# Patient Record
Sex: FEMALE | URBAN - METROPOLITAN AREA
[De-identification: names, ages, dates, MRNs, and addresses within clinical notes are randomized per-mention and may not be internally consistent; named-entity substitution may affect disease eponyms.]

---

## 2018-09-10 ENCOUNTER — TRANSFERRED RECORDS (OUTPATIENT)
Dept: HEALTH INFORMATION MANAGEMENT | Facility: CLINIC | Age: 53
End: 2018-09-10

## 2018-12-03 ENCOUNTER — PATIENT OUTREACH (OUTPATIENT)
Dept: CARE COORDINATION | Facility: CLINIC | Age: 53
End: 2018-12-03

## 2018-12-03 NOTE — TELEPHONE ENCOUNTER
FUTURE VISIT INFORMATION      FUTURE VISIT INFORMATION:    Date: 12/05/18    Time: 730am    Location: [facility]  REFERRAL INFORMATION:    Referring provider:  MICHELLE CURRAN    Referring providers clinic:  [facility]    Reason for visit/diagnosis  Mild Papilledema    RECORDS REQUESTED FROM:       Clinic name Comments Records Status Imaging Status   [facility] Notes transferred to HIM IN [facility] CT Sinus 4/2018  In PACS   [facility] CT Facial bones 11/2012  In PACS

## 2018-12-04 ENCOUNTER — PATIENT OUTREACH (OUTPATIENT)
Dept: CARE COORDINATION | Facility: CLINIC | Age: 53
End: 2018-12-04

## 2018-12-05 ENCOUNTER — PRE VISIT (OUTPATIENT)
Dept: OPHTHALMOLOGY | Facility: CLINIC | Age: 53
End: 2018-12-05

## 2019-01-07 ENCOUNTER — TELEPHONE (OUTPATIENT)
Dept: OPHTHALMOLOGY | Facility: CLINIC | Age: 54
End: 2019-01-07

## 2019-01-07 ENCOUNTER — OFFICE VISIT (OUTPATIENT)
Dept: OPHTHALMOLOGY | Facility: CLINIC | Age: 54
End: 2019-01-07
Attending: OPHTHALMOLOGY
Payer: COMMERCIAL

## 2019-01-07 DIAGNOSIS — H53.10 SUBJECTIVE VISUAL DISTURBANCE: Primary | ICD-10-CM

## 2019-01-07 DIAGNOSIS — H47.093 DISORDER OF OPTIC NERVE OF BOTH EYES: ICD-10-CM

## 2019-01-07 DIAGNOSIS — H53.10 SUBJECTIVE VISUAL DISTURBANCE: ICD-10-CM

## 2019-01-07 DIAGNOSIS — H51.9 CONVERGENCE INSUFFICIENCY OR PALSY IN BINOCULAR EYE MOVEMENT: ICD-10-CM

## 2019-01-07 DIAGNOSIS — H47.333 PSEUDOPAPILLEDEMA OF BOTH OPTIC DISCS: Primary | ICD-10-CM

## 2019-01-07 PROBLEM — H25.813 COMBINED FORMS OF AGE-RELATED CATARACT OF BOTH EYES: Status: ACTIVE | Noted: 2018-01-26

## 2019-01-07 PROBLEM — H04.123 DRY EYE SYNDROME OF BILATERAL LACRIMAL GLANDS: Status: ACTIVE | Noted: 2018-01-26

## 2019-01-07 PROCEDURE — 92250 FUNDUS PHOTOGRAPHY W/I&R: CPT | Mod: XU | Performed by: OPHTHALMOLOGY

## 2019-01-07 PROCEDURE — 92133 CPTRZD OPH DX IMG PST SGM ON: CPT | Performed by: OPHTHALMOLOGY

## 2019-01-07 PROCEDURE — 92060 SENSORIMOTOR EXAMINATION: CPT | Mod: ZF | Performed by: OPHTHALMOLOGY

## 2019-01-07 PROCEDURE — 92083 EXTENDED VISUAL FIELD XM: CPT | Mod: ZF | Performed by: OPHTHALMOLOGY

## 2019-01-07 PROCEDURE — G0463 HOSPITAL OUTPT CLINIC VISIT: HCPCS | Mod: 25,ZF

## 2019-01-07 RX ORDER — SENNOSIDES A AND B 8.6 MG/1
TABLET, FILM COATED ORAL
Refills: 0 | COMMUNITY
Start: 2018-09-23

## 2019-01-07 RX ORDER — BENZONATATE 100 MG/1
CAPSULE ORAL
COMMUNITY
Start: 2019-01-04

## 2019-01-07 RX ORDER — IBUPROFEN 600 MG/1
TABLET, FILM COATED ORAL
Refills: 0 | COMMUNITY
Start: 2018-12-31

## 2019-01-07 RX ORDER — DICLOFENAC SODIUM 100 MG/1
100 TABLET, FILM COATED, EXTENDED RELEASE ORAL DAILY
Refills: 0 | COMMUNITY
Start: 2018-07-10

## 2019-01-07 RX ORDER — PYRITHIONE ZINC 1 %
SHAMPOO TOPICAL
Refills: 1 | COMMUNITY
Start: 2018-10-12

## 2019-01-07 RX ORDER — LANCETS 23 GAUGE
EACH MISCELLANEOUS
COMMUNITY
Start: 2016-08-19

## 2019-01-07 ASSESSMENT — VISUAL ACUITY
METHOD: SNELLEN - LINEAR
CORRECTION_TYPE: GLASSES
OS_CC: 20/20
OD_CC: 20/20

## 2019-01-07 ASSESSMENT — REFRACTION_WEARINGRX
OS_SPHERE: -3.75
OS_AXIS: 180
OD_CYLINDER: +0.75
OD_SPHERE: -2.75
OS_CYLINDER: +1.50
OD_AXIS: 180
OD_ADD: +2.00
OS_ADD: +2.00

## 2019-01-07 ASSESSMENT — TONOMETRY
IOP_METHOD: ICARE
OD_IOP_MMHG: 16
OS_IOP_MMHG: 14

## 2019-01-07 ASSESSMENT — SLIT LAMP EXAM - LIDS
COMMENTS: NORMAL
COMMENTS: NORMAL

## 2019-01-07 ASSESSMENT — CUP TO DISC RATIO
OD_RATIO: 0
OS_RATIO: 0

## 2019-01-07 ASSESSMENT — CONF VISUAL FIELD
OS_NORMAL: 1
OD_NORMAL: 1

## 2019-01-07 ASSESSMENT — EXTERNAL EXAM - LEFT EYE: OS_EXAM: NORMAL

## 2019-01-07 ASSESSMENT — EXTERNAL EXAM - RIGHT EYE: OD_EXAM: NORMAL
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
## 2019-01-07 NOTE — TELEPHONE ENCOUNTER
[facility] Call Center    Phone Message    May a detailed message be left on voicemail: yes    Reason for Call: Other: Pt say Dr Ly today but was unclear about his direction.  Dr Ly told her to get several injections but she doesn't know where he wants the work done.  From what little I could make out the in the Dr assessment he wants her to go back to her primary care Dr.  Pt would like a call to clarify and if she is supposed to return to her primary would like an referral/order sent to the primary.  Please call to discuss.     Action Taken: Message routed to:  [facility] clinic
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
## 2019-01-08 NOTE — TELEPHONE ENCOUNTER
Left message at 0924    Reviewed dr. Ly recommended going back to same MD that performed elbow injection per last note    Direct number provided for further assistance  Dung Little RN 9:25 AM 01/08/19

## 2019-01-16 ENCOUNTER — TELEPHONE (OUTPATIENT)
Dept: OPHTHALMOLOGY | Facility: CLINIC | Age: 54
End: 2019-01-16

## 2019-01-16 NOTE — TELEPHONE ENCOUNTER
Note to Dr. Cooper/facilitator to assist in referral order per request:    Per my review-- greater occipital nerve block    Dung Little RN 1:11 PM 01/16/19    Message below received by triage    : Pt is needing Dr.Michael Ly to fax over a referral for injections, in that referral what kind of injections, what he wants them to have done for pt. Fax needs to go to [facility] in [facility] fax is [phone], please call pt back to let her know the referral was faxed over. Thank you

## 2019-01-16 NOTE — TELEPHONE ENCOUNTER
[facility] Call Center    Phone Message    May a detailed message be left on voicemail: yes    Reason for Call: Other: Pt is needing Dr.Michael Ly to fax over a referral for injections, in that referral what kind of injections, what he wants them to have done for pt. Fax needs to go to [facility] in [facility] fax is [phone], please call pt back to let her know the referral was faxed over. Thank you     Action Taken: Message routed to:  [facility] Eye

## 2019-01-18 ENCOUNTER — TELEPHONE (OUTPATIENT)
Dept: OPHTHALMOLOGY | Facility: CLINIC | Age: 54
End: 2019-01-18

## 2019-01-18 NOTE — TELEPHONE ENCOUNTER
[facility]    Phone Message    May a detailed message be left on voicemail: yes    Reason for Call: Other: Pt is calling again to try to find out what is holding up her referral to [facility] in [place] for injections.  Pt has been going without and has lost patience with us.  Please fax referral to [phone] and call Pt to let her know.     Action Taken: Message routed to:  [facility] eye clinic

## 2019-01-21 ENCOUNTER — TELEPHONE (OUTPATIENT)
Dept: OPHTHALMOLOGY | Facility: CLINIC | Age: 54
End: 2019-01-21

## 2019-01-21 DIAGNOSIS — M54.81 CERVICO-OCCIPITAL NEURALGIA: Primary | ICD-10-CM

## 2019-01-21 NOTE — TELEPHONE ENCOUNTER
Also left voicemail stating notes faxed to [facility] and would be happy to mail last notes to her home address, but would be unable to send them via email.  Just asked that she return my call to verify home address.

## 2019-01-21 NOTE — TELEPHONE ENCOUNTER
Health Call Center    Phone Message    May a detailed message be left on voicemail: yes    Reason for Call: Other: Pt would like to understand Dr Ly'e last orders for her.  Can you please email a copy of last visit to her at [email or web address].  Pt also requests a call back from Dr Ly.  Pt has called for help at least 5 times this last week.     Action Taken: Message routed to:  [facility] eye clinic

## 2019-01-28 NOTE — TELEPHONE ENCOUNTER
[facility] Call Center    Phone Message    May a detailed message be left on voicemail: yes    Reason for Call: Other: per pt- is requesting a call back immeditately in regards to a referral, she wouldnt provide me any more information- please call pt back thanks!     Action Taken: Message routed to:  [facility] eye

## 2019-01-30 ENCOUNTER — TELEPHONE (OUTPATIENT)
Dept: OPHTHALMOLOGY | Facility: CLINIC | Age: 54
End: 2019-01-30

## 2019-01-30 NOTE — TELEPHONE ENCOUNTER
Spoke to Nesha Gould regarding patient's referral.  Dr. Ly had recommended greater occipital nerve block.  Not sure if it is related to a WC issue.  Their doctors can due specific block that they recommend.

## 2019-03-06 ENCOUNTER — OFFICE VISIT (OUTPATIENT)
Dept: OPHTHALMOLOGY | Facility: CLINIC | Age: 54
End: 2019-03-06
Payer: COMMERCIAL

## 2019-03-06 DIAGNOSIS — H47.333 PSEUDOPAPILLEDEMA OF BOTH OPTIC DISCS: Primary | ICD-10-CM

## 2019-03-06 DIAGNOSIS — H53.149 PHOTOPHOBIA: ICD-10-CM

## 2019-03-06 DIAGNOSIS — M54.81 CERVICO-OCCIPITAL NEURALGIA: ICD-10-CM

## 2019-03-06 DIAGNOSIS — H53.10 SUBJECTIVE VISUAL DISTURBANCE: Primary | ICD-10-CM

## 2019-03-06 ASSESSMENT — VISUAL ACUITY
CORRECTION_TYPE: GLASSES
OS_CC: 20/25
METHOD: SNELLEN - LINEAR
OS_CC+: +2
OD_CC: 20/20

## 2019-03-06 ASSESSMENT — EXTERNAL EXAM - RIGHT EYE: OD_EXAM: NORMAL

## 2019-03-06 ASSESSMENT — CONF VISUAL FIELD
OD_NORMAL: 1
OS_NORMAL: 1

## 2019-03-06 ASSESSMENT — REFRACTION_WEARINGRX
OS_AXIS: 180
OD_ADD: +2.00
OD_SPHERE: -2.75
OS_ADD: +2.00
OS_CYLINDER: +1.50
OD_CYLINDER: +0.75
OD_AXIS: 180
OS_SPHERE: -3.75

## 2019-03-06 ASSESSMENT — TONOMETRY
IOP_METHOD: ICARE
OD_IOP_MMHG: 20
OS_IOP_MMHG: 19

## 2019-03-06 ASSESSMENT — SLIT LAMP EXAM - LIDS
COMMENTS: NORMAL
COMMENTS: NORMAL

## 2019-03-06 ASSESSMENT — CUP TO DISC RATIO
OD_RATIO: 0
OS_RATIO: 0

## 2019-03-06 ASSESSMENT — EXTERNAL EXAM - LEFT EYE: OS_EXAM: NORMAL
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
## 2019-03-06 NOTE — LETTER
19       Rosemary Suarez   [address]  [place] [address]         RE: Rosemary Suarez   : 1965   MRN: [id number]     To Whom It May Concern:     I am the ophthalmologist treating Rosemary Suarez.  She suffers from headaches related to her cervico-occipital neuralgia (ICD-10 M54.81) and and photophobia (ICD-10 H53.149). Rosemary Suarez would benefit from a darker tint on her glasses or FL-41 tinted glasses for those reasons.  Please consider covering the cost of these tinted lenses.     If you have any questions please feel free to call my office at number above.     Sincerely,       Otoniel Ly MD   Department of Ophthalmology   [facility]

## 2019-03-06 NOTE — NURSING NOTE
Chief Complaints and History of Present Illnesses   Patient presents with     Disc Pseudopapilledema Follow Up     Chief Complaint(s) and History of Present Illness(es)     Disc Pseudopapilledema Follow Up     Laterality: both eyes    Onset: gradual    Onset: months ago    Frequency: constantly              Comments     Rosemary Suarez is a 54 year old female who presents today for  1. Eye pain  In bright lit conditions.   Onset: a few days ago.   No vision issues. Cannot see clearly in the sun.     Roseline CARR 8:24 AM March 6, 2019

## 2019-03-06 NOTE — PATIENT INSTRUCTIONS
You are sensitive to the light.  There is generally no known cause for this.  There is a specialized tint called FL-41 that blocks a certain wavelength of light known to cause light sensitivity. Your eyeglass lenses can be tinted with this at just about any eyeglass store but not all stores carry this tint.  You should call before visiting the store.  The store may try to substitute with a tint that they have in stock, but I would recommend against it.  There is also a company that will sell FL-41 tinted lenses online at [employer] or [employer].      Generally speaking, when you are at home, try not to wear sunglasses inside, especially the evenings.  The more you wear them, the less tolerant of light you become.  This does not apply to the FL-41 lenses since they are not that dark.  There are also smart LED light bulbs that can be controlled from your smart phone.  They can be made any color and any brightness.  You may find that there is a particular color and brightness that helps you.      There are tinted contact lenses.  These can be made using the FL-41 tint or another color.  They are very custom made and so they tend to cost hundreds of dollars.      You can also consider wearing a hat, tinting your windshield, get a shield above your cubicle, tinting the windows in your home and change the bulbs in your office.
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
## 2019-03-11 ENCOUNTER — TELEPHONE (OUTPATIENT)
Dept: OPHTHALMOLOGY | Facility: CLINIC | Age: 54
End: 2019-03-11

## 2019-03-11 NOTE — TELEPHONE ENCOUNTER
Note to Dr. Trevino to help assist in request for updated glasses Rx to be faxed to [facility]  -- finalize wearing Rx with recommendations for FL-41 tint for photophobia/light sensitivities   Dung Little RN 7:42 AM 03/12/19      [facility] Call Center    Phone Message    May a detailed message be left on voicemail: yes    Reason for Call: Other: Pt was recently seen by Dr Ly and her script changed. Please fax the current script to [facility] at [phone]     Action Taken: Message routed to:  [facility] see above

## 2019-03-14 NOTE — TELEPHONE ENCOUNTER
Sent Rx script with indication for FL-41 tint to [facility].    Demetrius Trevino MD  PGY-3 Ophthalmology Resident  [phone]